# Patient Record
Sex: MALE | Race: WHITE | RURAL
[De-identification: names, ages, dates, MRNs, and addresses within clinical notes are randomized per-mention and may not be internally consistent; named-entity substitution may affect disease eponyms.]

---

## 2018-04-03 ENCOUNTER — OFFICE VISIT (OUTPATIENT)
Dept: FAMILY MEDICINE CLINIC | Age: 68
End: 2018-04-03

## 2018-04-03 VITALS
TEMPERATURE: 96.4 F | HEIGHT: 68 IN | OXYGEN SATURATION: 100 % | BODY MASS INDEX: 28.04 KG/M2 | WEIGHT: 185 LBS | DIASTOLIC BLOOD PRESSURE: 82 MMHG | HEART RATE: 77 BPM | SYSTOLIC BLOOD PRESSURE: 126 MMHG

## 2018-04-03 DIAGNOSIS — Z02.4 ENCOUNTER FOR CDL (COMMERCIAL DRIVING LICENSE) EXAM: Primary | ICD-10-CM

## 2018-04-03 LAB
BILIRUB UR QL STRIP: NEGATIVE
GLUCOSE UR-MCNC: NEGATIVE MG/DL
KETONES P FAST UR STRIP-MCNC: NEGATIVE MG/DL
PH UR STRIP: 7.5 [PH] (ref 4.6–8)
PROT UR QL STRIP: NEGATIVE
SP GR UR STRIP: 1.01 (ref 1–1.03)
UA UROBILINOGEN AMB POC: NORMAL (ref 0.2–1)
URINALYSIS CLARITY POC: CLEAR
URINALYSIS COLOR POC: NORMAL
URINE BLOOD POC: NEGATIVE
URINE LEUKOCYTES POC: NEGATIVE
URINE NITRITES POC: NEGATIVE

## 2018-04-03 NOTE — PROGRESS NOTES
Greg Cordoba is a 74 y.o. 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 he reports feeling well today with no complaints. *See scanned media for full history and physical exam.    Visit Vitals    /82 (BP 1 Location: Left arm, BP Patient Position: Sitting)    Pulse 77    Temp 96.4 °F (35.8 °C) (Oral)    Ht 5' 8\" (1.727 m)    Wt 185 lb (83.9 kg)    SpO2 100%    BMI 28.13 kg/m2       Results for orders placed or performed in visit on 04/03/18   AMB POC URINALYSIS DIP STICK AUTO W/O MICRO   Result Value Ref Range    Color (UA POC)      Clarity (UA POC) Clear     Glucose (UA POC) Negative Negative    Bilirubin (UA POC) Negative Negative    Ketones (UA POC) Negative Negative    Specific gravity (UA POC) 1.015 1.001 - 1.035    Blood (UA POC) Negative Negative    pH (UA POC) 7.5 4.6 - 8.0    Protein (UA POC) Negative Negative    Urobilinogen (UA POC) 0.2 mg/dL 0.2 - 1    Nitrites (UA POC) Negative Negative    Leukocyte esterase (UA POC) Negative Negative       DOT forms completed and scanned into patient's chart.       Sherie Vu PA-C

## 2018-04-03 NOTE — PROGRESS NOTES
Gloria Chua  Identified pt with two pt identifiers(name and ). Chief Complaint   Patient presents with    Employment Physical     patient is here for a DOT physical       1. Have you been to the ER, urgent care clinic since your last visit? Hospitalized since your last visit? NO    2. Have you seen or consulted any other health care providers outside of the [facility] since your last visit? Include any pap smears or colon screening. NO      MAMTA Gray notified of reason for visit, vitals and flowsheets obtained on patients. Patient received paperwork for advance directive during previous visit but has not completed at this time     Reviewed record In preparation for visit, huddled with provider and have obtained necessary documentation      Health Maintenance Due   Topic    Hepatitis C Screening     DTaP/Tdap/Td series (1 - Tdap)    FOBT Q 1 YEAR AGE 50-75     ZOSTER VACCINE AGE 60>     GLAUCOMA SCREENING Q2Y     Pneumococcal 65+ Low/Medium Risk (1 of 2 - PCV13)    Influenza Age 5 to Adult        Wt Readings from Last 3 Encounters:   18 185 lb (83.9 kg)   14 187 lb (84.8 kg)     Temp Readings from Last 3 Encounters:   18 96.4 °F (35.8 °C) (Oral)   14 96.7 °F (35.9 °C) (Oral)     BP Readings from Last 3 Encounters:   18 126/82   14 110/88     Pulse Readings from Last 3 Encounters:   18 77   14 88     Vitals:    18 1316   BP: 126/82   Pulse: 77   Temp: 96.4 °F (35.8 °C)   TempSrc: Oral   SpO2: 100%   Weight: 185 lb (83.9 kg)   Height: 5' 8\" (1.727 m)   PainSc:   0 - No pain         Learning Assessment:  :     No flowsheet data found. Depression Screening:  :     PHQ over the last two weeks 4/3/2018   Little interest or pleasure in doing things Not at all   Feeling down, depressed or hopeless Not at all   Total Score PHQ 2 0       Fall Risk Assessment:  :     Fall Risk Assessment, last 12 mths 4/3/2018   Able to walk? Yes   Fall in past 12 months? No       Abuse Screening:  :     No flowsheet data found. ADL Screening:  :     No flowsheet data found. Patient is accompanied by self I have received verbal consent from Sonda Bosworth to discuss any/all medical information while they are present in the room. Medication reconciliation up to date and corrected with patient at this time.